# Patient Record
Sex: MALE | Race: WHITE | NOT HISPANIC OR LATINO | URBAN - METROPOLITAN AREA
[De-identification: names, ages, dates, MRNs, and addresses within clinical notes are randomized per-mention and may not be internally consistent; named-entity substitution may affect disease eponyms.]

---

## 2018-12-03 ASSESSMENT — MIFFLIN-ST. JEOR: SCORE: 1648.39

## 2018-12-05 ENCOUNTER — ANESTHESIA - HEALTHEAST (OUTPATIENT)
Dept: SURGERY | Facility: CLINIC | Age: 78
End: 2018-12-05

## 2018-12-06 ENCOUNTER — SURGERY - HEALTHEAST (OUTPATIENT)
Dept: SURGERY | Facility: CLINIC | Age: 78
End: 2018-12-06

## 2019-03-07 ENCOUNTER — SURGERY - HEALTHEAST (OUTPATIENT)
Dept: SURGERY | Facility: CLINIC | Age: 79
End: 2019-03-07

## 2019-03-07 ENCOUNTER — ANESTHESIA - HEALTHEAST (OUTPATIENT)
Dept: SURGERY | Facility: CLINIC | Age: 79
End: 2019-03-07

## 2019-03-07 ENCOUNTER — AMBULATORY - HEALTHEAST (OUTPATIENT)
Dept: OTHER | Facility: CLINIC | Age: 79
End: 2019-03-07

## 2019-03-07 ASSESSMENT — MIFFLIN-ST. JEOR: SCORE: 1641.31

## 2021-06-02 VITALS — WEIGHT: 193.44 LBS | BODY MASS INDEX: 25.64 KG/M2 | HEIGHT: 73 IN

## 2021-06-02 VITALS — BODY MASS INDEX: 25.84 KG/M2 | WEIGHT: 195 LBS | HEIGHT: 73 IN

## 2021-06-22 NOTE — ANESTHESIA PROCEDURE NOTES
Peripheral Block    Patient location during procedure: pre-op  Start time: 12/6/2018 7:07 AM  End time: 12/6/2018 7:10 AM  post-op analgesia per surgeon order as noted in medical record  Staffing:  Performing  Anesthesiologist: Barry Buck MD  Preanesthetic Checklist  Completed: patient identified, site marked, risks, benefits, and alternatives discussed, timeout performed, consent obtained, airway assessed, oxygen available, suction available, emergency drugs available and hand hygiene performed  Peripheral Block  Block type: saphenous  Prep: ChloraPrep  Patient position: supine  Patient monitoring: cardiac monitor, continuous pulse oximetry, heart rate and blood pressure  Laterality: right  Injection technique: ultrasound guided    Ultrasound used to visualize needle placement in proximity to nerve being blocked: yes   Permanent ultrasound image captured for medical record  Sterile gel and probe cover used for ultrasound.    Needle  Needle type: Stimuplex   Needle gauge: 20G  Needle length: 6 in  no peripheral nerve catheter placed  Assessment  Injection assessment: negative aspiration for heme, no paresthesia on injection, incremental injection and no difficulty with injection

## 2021-06-22 NOTE — ANESTHESIA CARE TRANSFER NOTE
Last vitals:   Vitals:    12/06/18 0926   BP: 131/74   Pulse: 82   Resp: 18   Temp: 36.7  C (98  F)   SpO2: 98%     Patient's level of consciousness is drowsy  Spontaneous respirations: yes  Maintains airway independently: yes  Dentition unchanged: yes  Oropharynx: oropharynx clear of all foreign objects    QCDR Measures:  ASA# 20 - Surgical Safety Checklist: WHO surgical safety checklist completed prior to induction    PQRS# 430 - Adult PONV Prevention: 4558F - Pt received => 2 anti-emetic agents (different classes) preop & intraop  ASA# 8 - Peds PONV Prevention: NA - Not pediatric patient, not GA or 2 or more risk factors NOT present  PQRS# 424 - Jenny-op Temp Management: 4559F - At least one body temp DOCUMENTED => 35.5C or 95.9F within required timeframe  PQRS# 426 - PACU Transfer Protocol: - Transfer of care checklist used  ASA# 14 - Acute Post-op Pain: ASA14B - Patient did NOT experience pain >= 7 out of 10

## 2021-06-22 NOTE — ANESTHESIA PREPROCEDURE EVALUATION
Anesthesia Evaluation      Patient summary reviewed     Airway   Mallampati: II  Neck ROM: full   Pulmonary - negative ROS and normal exam    breath sounds clear to auscultation                         Cardiovascular - normal exam  (+) hypertension, CAD, ,     ECG reviewed  Rhythm: regular  Rate: normal,         Neuro/Psych    (+) CVA (RUE weaknesss, tingling) ,     Endo/Other    (+) diabetes mellitus type 2,      GI/Hepatic/Renal    (-) GERD          Dental - normal exam                        Anesthesia Plan  Planned anesthetic: general endotracheal and peripheral nerve block  Pop/adductor blocks for post op pain control req by surgeon  ASA 3   Induction: intravenous   Anesthetic plan and risks discussed with: patient  Anesthesia plan special considerations: antiemetics,   Post-op plan: routine recovery

## 2021-06-22 NOTE — ANESTHESIA PROCEDURE NOTES
Peripheral Block    Patient location during procedure: pre-op  Start time: 12/6/2018 7:01 AM  End time: 12/6/2018 7:06 AM  post-op analgesia per surgeon order as noted in medical record  Staffing:  Performing  Anesthesiologist: Barry Buck MD  Preanesthetic Checklist  Completed: patient identified, site marked, risks, benefits, and alternatives discussed, timeout performed, consent obtained, airway assessed, oxygen available, suction available, emergency drugs available and hand hygiene performed  Peripheral Block  Block type: sciatic  Prep: ChloraPrep  Patient position: supine  Patient monitoring: cardiac monitor, continuous pulse oximetry, heart rate and blood pressure  Laterality: right  Injection technique: ultrasound guided    Ultrasound used to visualize needle placement in proximity to nerve being blocked: yes   Permanent ultrasound image captured for medical record  Sterile gel and probe cover used for ultrasound.    Needle  Needle type: Stimuplex   Needle gauge: 20G  Needle length: 4 in  no peripheral nerve catheter placed  Assessment  Injection assessment: no difficulty with injection, negative aspiration for heme, no paresthesia on injection and incremental injection

## 2021-06-22 NOTE — ANESTHESIA POSTPROCEDURE EVALUATION
Patient: Bart Lehman.GASTROCNEMIUS RECESSION, CALCANEAL OSTEOTOMY, FLEXOR DIGITORUM LONGUS TENDON TRANSFER RIGHT FOOT  Anesthesia type: general    Patient location: PACU  Last vitals:   Vitals:    12/06/18 0950   BP: 112/57   Pulse: 71   Resp: 11   Temp: 36.3  C (97.3  F)   SpO2: 95%     Post vital signs: stable  Level of consciousness: awake, alert and responds to simple questions  Post-anesthesia pain: pain controlled  Post-anesthesia nausea and vomiting: no  Pulmonary: unassisted, return to baseline  Cardiovascular: stable and blood pressure at baseline  Hydration: adequate  Anesthetic events: no    QCDR Measures:  ASA# 11 - Jenny-op Cardiac Arrest: ASA11B - Patient did NOT experience unanticipated cardiac arrest  ASA# 12 - Jenny-op Mortality Rate: ASA12B - Patient did NOT die  ASA# 13 - PACU Re-Intubation Rate: ASA13B - Patient did NOT require a new airway mgmt  ASA# 10 - Composite Anes Safety: ASA10A - No serious adverse event    Additional Notes:

## 2021-06-24 NOTE — ANESTHESIA CARE TRANSFER NOTE
Last vitals:   Vitals:    03/07/19 1218   BP: 119/59   Pulse: 69   Resp: 10   Temp: 36.7  C (98.1  F)   SpO2: 100%     Patient's level of consciousness is awake  Spontaneous respirations: yes  Maintains airway independently: yes  Dentition unchanged: yes  Oropharynx: oropharynx clear of all foreign objects    QCDR Measures:  ASA# 20 - Surgical Safety Checklist: WHO surgical safety checklist completed prior to induction    PQRS# 430 - Adult PONV Prevention: 4558F - Pt received => 2 anti-emetic agents (different classes) preop & intraop  ASA# 8 - Peds PONV Prevention: NA - Not pediatric patient, not GA or 2 or more risk factors NOT present  PQRS# 424 - Jenny-op Temp Management: 4559F - At least one body temp DOCUMENTED => 35.5C or 95.9F within required timeframe  PQRS# 426 - PACU Transfer Protocol: - Transfer of care checklist used  ASA# 14 - Acute Post-op Pain: ASA14B - Patient did NOT experience pain >= 7 out of 10

## 2021-06-24 NOTE — ANESTHESIA POSTPROCEDURE EVALUATION
Patient: Bart Martel  -RIGHT MINIMALLY INVASIVE TOTAL KNEE ARTHROPLASTY  Anesthesia type: spinal    Patient location: PACU  Last vitals:   Vitals:    03/07/19 1220   BP: 103/55   Pulse: 73   Resp: 12   Temp:    SpO2: 98%     Post vital signs: stable  Level of consciousness: awake and responds to simple questions  Post-anesthesia pain: pain controlled  Post-anesthesia nausea and vomiting: no  Pulmonary: unassisted, return to baseline  Cardiovascular: stable and blood pressure at baseline  Hydration: adequate  Anesthetic events: no    QCDR Measures:  ASA# 11 - Jenny-op Cardiac Arrest: ASA11B - Patient did NOT experience unanticipated cardiac arrest  ASA# 12 - Jenny-op Mortality Rate: ASA12B - Patient did NOT die  ASA# 13 - PACU Re-Intubation Rate: NA - No ETT / LMA used for case  ASA# 10 - Composite Anes Safety: ASA10A - No serious adverse event    Additional Notes:

## 2021-06-24 NOTE — ANESTHESIA POSTPROCEDURE EVALUATION
Patient: Bart Martel  -RIGHT MINIMALLY INVASIVE TOTAL KNEE ARTHROPLASTY  Anesthesia type: spinal    Patient location: PACU  Last vitals:   Vitals:    03/07/19 1415   BP: 122/67   Pulse: 67   Resp: 16   Temp: 36.6  C (97.8  F)   SpO2: 93%     Post vital signs: stable  Level of consciousness: awake and responds to simple questions  Post-anesthesia pain: pain controlled  Post-anesthesia nausea and vomiting: no  Pulmonary: unassisted, return to baseline  Cardiovascular: stable and blood pressure at baseline  Hydration: adequate  Anesthetic events: no    QCDR Measures:  ASA# 11 - Jenny-op Cardiac Arrest: ASA11B - Patient did NOT experience unanticipated cardiac arrest  ASA# 12 - Jenny-op Mortality Rate: ASA12B - Patient did NOT die  ASA# 13 - PACU Re-Intubation Rate: NA - No ETT / LMA used for case  ASA# 10 - Composite Anes Safety: ASA10A - No serious adverse event    Additional Notes:

## 2021-06-24 NOTE — ANESTHESIA PREPROCEDURE EVALUATION
Anesthesia Evaluation      Patient summary reviewed   No history of anesthetic complications     Airway   Mallampati: I  Neck ROM: full   Pulmonary - negative ROS and normal exam                          Cardiovascular   Exercise tolerance: > or = 4 METS  (+) hypertension well controlled, CAD, ,     (-) valvular problems/murmurs, CABG/stent, dysrhythmias  ECG reviewed  Rhythm: regular  Rate: normal,      ROS comment: Plavix DC 10 days ago     Neuro/Psych    (+) CVA ,     Comments: CVA 2000 balance issues and hot/cold differentiation    Endo/Other    (+) diabetes mellitus type 2 well controlled,      GI/Hepatic/Renal - negative ROS           Dental - normal exam                        Anesthesia Plan  Planned anesthetic: spinal  Discussed rare risks of bleeding, infection, nerve damage, failure and LAST. We discussed risks of headache, failure and low blood pressure. Pt wishes to proceed  Discussed AC nerve block PSR for POPC risks discussed  ASA 3     Anesthetic plan and risks discussed with: patient, spouse and child/children    Post-op plan: routine recovery

## 2021-06-24 NOTE — ANESTHESIA PROCEDURE NOTES
Peripheral Block    Patient location during procedure: pre-op  Start time: 3/7/2019 8:55 AM  End time: 3/7/2019 8:59 AM  post-op analgesia per surgeon order as noted in medical record  Staffing:  Performing  Anesthesiologist: Tejas Anthony MD  Preanesthetic Checklist  Completed: patient identified, site marked, risks, benefits, and alternatives discussed, timeout performed, consent obtained, at patient's request, airway assessed, oxygen available, suction available, emergency drugs available and hand hygiene performed  Peripheral Block  Block type: saphenous, adductor canal block  Prep: ChloraPrep  Patient position: supine  Patient monitoring: heart rate, continuous pulse oximetry, cardiac monitor and blood pressure  Laterality: right  Injection technique: ultrasound guided    Ultrasound used to visualize needle placement in proximity to nerve being blocked: yes   Permanent ultrasound image captured for medical record  Sterile gel and probe cover used for ultrasound.    Needle  Needle type: Stimuplex   Needle gauge: 20G  Needle length: 4 in  no peripheral nerve catheter placed  Assessment  Injection assessment: no difficulty with injection, no paresthesia on injection and incremental injection  Additional Notes  Heme aspirated times 2. No signs of LAST.

## 2021-06-24 NOTE — ANESTHESIA PROCEDURE NOTES
Spinal Block    Patient location during procedure: OR  Start time: 3/7/2019 10:24 AM  End time: 3/7/2019 10:27 AM  Reason for block: primary anesthetic    Staffing:  Performing  Anesthesiologist: Tejas Anthony MD    Preanesthetic Checklist  Completed: patient identified, risks, benefits, and alternatives discussed, timeout performed, consent obtained, airway assessed, oxygen available, suction available, emergency drugs available and hand hygiene performed  Spinal Block  Patient position: sitting  Prep: ChloraPrep  Patient monitoring: heart rate, cardiac monitor, continuous pulse ox and blood pressure  Approach: midline  Location: L3-4  Injection technique: single-shot  Needle type: pencil-tip   Needle gauge: 24 G